# Patient Record
Sex: MALE
[De-identification: names, ages, dates, MRNs, and addresses within clinical notes are randomized per-mention and may not be internally consistent; named-entity substitution may affect disease eponyms.]

---

## 2018-12-20 LAB
ALBUMIN SERPL-MCNC: 4.5 G/DL (ref 3.5–5)
ALBUMIN/GLOB SERPL: 1.4 {RATIO} (ref 0.8–2)
ALP SERPL-CCNC: 99 IU/L (ref 40–150)
ALT SERPL-CCNC: 19 IU/L (ref 0–55)
ANION GAP SERPL CALC-SCNC: 16.7 MMOL/L (ref 8–16)
BASOPHILS # BLD AUTO: 0.1 10*3/UL (ref 0–0.1)
BASOPHILS NFR BLD AUTO: 0.5 % (ref 0–1)
BUN SERPL-MCNC: 12 MG/DL (ref 7–26)
BUN/CREAT SERPL: 11 (ref 6–25)
CALCIUM SERPL-MCNC: 9.9 MG/DL (ref 8.4–10.2)
CHLORIDE SERPL-SCNC: 104 MMOL/L (ref 98–107)
CO2 SERPL-SCNC: 24 MMOL/L (ref 22–29)
DEPRECATED INR PLAS: 0.86
DEPRECATED NEUTROPHILS # BLD AUTO: 4.4 10*3/UL (ref 2.1–6.9)
EGFRCR SERPLBLD CKD-EPI 2021: > 60 ML/MIN (ref 60–?)
EOSINOPHIL # BLD AUTO: 0.6 10*3/UL (ref 0–0.4)
EOSINOPHIL NFR BLD AUTO: 5.9 % (ref 0–6)
ERYTHROCYTE [DISTWIDTH] IN CORD BLOOD: 13.2 % (ref 11.7–14.4)
GLOBULIN PLAS-MCNC: 3.3 G/DL (ref 2.3–3.5)
GLUCOSE SERPLBLD-MCNC: 102 MG/DL (ref 74–118)
HCT VFR BLD AUTO: 49.7 % (ref 38.2–49.6)
HGB BLD-MCNC: 16.6 G/DL (ref 14–18)
LYMPHOCYTES # BLD: 3.4 10*3/UL (ref 1–3.2)
LYMPHOCYTES NFR BLD AUTO: 36.5 % (ref 18–39.1)
MCH RBC QN AUTO: 31.9 PG (ref 28–32)
MCHC RBC AUTO-ENTMCNC: 33.4 G/DL (ref 31–35)
MCV RBC AUTO: 95.4 FL (ref 81–99)
MONOCYTES # BLD AUTO: 0.8 10*3/UL (ref 0.2–0.8)
MONOCYTES NFR BLD AUTO: 9 % (ref 4.4–11.3)
NEUTS SEG NFR BLD AUTO: 47.8 % (ref 38.7–80)
PLATELET # BLD AUTO: 210 X10E3/UL (ref 140–360)
POTASSIUM SERPL-SCNC: 4.7 MMOL/L (ref 3.5–5.1)
PROTHROMBIN TIME: 12.5 SECONDS (ref 11.9–14.5)
RBC # BLD AUTO: 5.21 X10E6/UL (ref 4.3–5.7)
SODIUM SERPL-SCNC: 140 MMOL/L (ref 136–145)

## 2018-12-21 ENCOUNTER — HOSPITAL ENCOUNTER (OUTPATIENT)
Dept: HOSPITAL 88 - CATH LAB | Age: 58
Discharge: HOME | End: 2018-12-21
Attending: INTERNAL MEDICINE
Payer: COMMERCIAL

## 2018-12-21 VITALS — SYSTOLIC BLOOD PRESSURE: 167 MMHG | DIASTOLIC BLOOD PRESSURE: 87 MMHG

## 2018-12-21 VITALS — WEIGHT: 195 LBS | BODY MASS INDEX: 27.92 KG/M2 | HEIGHT: 70 IN

## 2018-12-21 VITALS — DIASTOLIC BLOOD PRESSURE: 80 MMHG | SYSTOLIC BLOOD PRESSURE: 150 MMHG

## 2018-12-21 VITALS — SYSTOLIC BLOOD PRESSURE: 126 MMHG | DIASTOLIC BLOOD PRESSURE: 75 MMHG

## 2018-12-21 VITALS — SYSTOLIC BLOOD PRESSURE: 122 MMHG | DIASTOLIC BLOOD PRESSURE: 77 MMHG

## 2018-12-21 VITALS — DIASTOLIC BLOOD PRESSURE: 77 MMHG | SYSTOLIC BLOOD PRESSURE: 132 MMHG

## 2018-12-21 DIAGNOSIS — I70.212: Primary | ICD-10-CM

## 2018-12-21 DIAGNOSIS — Z01.812: ICD-10-CM

## 2018-12-21 DIAGNOSIS — Z82.49: ICD-10-CM

## 2018-12-21 DIAGNOSIS — Z79.82: ICD-10-CM

## 2018-12-21 DIAGNOSIS — Z86.718: ICD-10-CM

## 2018-12-21 DIAGNOSIS — I10: ICD-10-CM

## 2018-12-21 PROCEDURE — 85610 PROTHROMBIN TIME: CPT

## 2018-12-21 PROCEDURE — 75716 ARTERY X-RAYS ARMS/LEGS: CPT

## 2018-12-21 PROCEDURE — 80053 COMPREHEN METABOLIC PANEL: CPT

## 2018-12-21 PROCEDURE — 36247 INS CATH ABD/L-EXT ART 3RD: CPT

## 2018-12-21 PROCEDURE — 85025 COMPLETE CBC W/AUTO DIFF WBC: CPT

## 2018-12-21 PROCEDURE — 75625 CONTRAST EXAM ABDOMINL AORTA: CPT

## 2018-12-21 PROCEDURE — 37225: CPT

## 2018-12-21 PROCEDURE — 36415 COLL VENOUS BLD VENIPUNCTURE: CPT

## 2018-12-21 PROCEDURE — 37186 SEC ART THROMBECTOMY ADD-ON: CPT

## 2018-12-22 NOTE — OPERATIVE REPORT
DATE OF PROCEDURE:  December 21, 2018



INDICATIONS:  Peripheral arterial disease, claudication of left lower 

extremity.



PROCEDURES PERFORMED

1. Left abdominal aortogram.

2. Bilateral lower extremity angiograms.

3. Third-order catheter placement from the right femoral artery to left 

superficial femoral artery.

4. Additional third order catheter placement from the right femoral 

artery to the left popliteal artery.

5. Atherectomy of the left common femoral artery with balloon 

angioplasty with drug-eluting balloon.

6. Deployment of right groin VASCADE closer device.



COMPLICATIONS:  None.



BLOOD LOSS:  10 mL.



RECOMMENDATIONS:  Continued medical therapy including dual antiplatelet 

therapy for at least 3 months.  Smoking cessation.



Access obtained in the right femoral artery.  A 6-Romanian sheath was placed. 

 Abdominal aortogram demonstrated a stent in the left iliac artery with 80% 

in-stent re-stenosis with gradient of approximately 40 mm.  The left and 

right femoral arteries had 20-30% stenosis.  Left anterior tibial artery 

was occluded, two-vessel runoff to the left foot, 3-vessels runoff to the 

right foot.





A decision was made to intervene on the left common femoral artery stent.  

The patient underwent atherectomy with large amounts of thrombus requiring 

secondary mechanical thrombectomy, balloon angioplasty with a 7 mm Lutonix 

balloon was performed.  Excellent result, 2 vessel runoff to the left leg.  

No complications.  Right groin repaired using VASCADE.  The patient was 

discharged home same day.







DD:  12/22/2018 09:51

DT:  12/22/2018 14:13

Job#:  [id number] NICHOLAS

## 2019-08-28 ENCOUNTER — HOSPITAL ENCOUNTER (EMERGENCY)
Dept: HOSPITAL 88 - ER | Age: 59
Discharge: HOME | End: 2019-08-28
Payer: COMMERCIAL

## 2019-08-28 VITALS — BODY MASS INDEX: 27.92 KG/M2 | HEIGHT: 70 IN | WEIGHT: 195 LBS

## 2019-08-28 DIAGNOSIS — N20.0: ICD-10-CM

## 2019-08-28 DIAGNOSIS — R10.9: Primary | ICD-10-CM

## 2019-08-28 LAB
BACTERIA URNS QL MICRO: (no result) /HPF
BILIRUB UR QL: NEGATIVE
CLARITY UR: (no result)
COLOR UR: YELLOW
DEPRECATED RBC URNS MANUAL-ACNC: (no result) /HPF (ref 0–5)
EPI CELLS URNS QL MICRO: (no result) /LPF
KETONES UR QL STRIP.AUTO: NEGATIVE
LEUKOCYTE ESTERASE UR QL STRIP.AUTO: NEGATIVE
MUCOUS THREADS URNS QL MICRO: (no result)
NITRITE UR QL STRIP.AUTO: NEGATIVE
PROT UR QL STRIP.AUTO: (no result)
SP GR UR STRIP: 1.02 (ref 1.01–1.02)
UROBILINOGEN UR STRIP-MCNC: 0.2 MG/DL (ref 0.2–1)
WBC #/AREA URNS HPF: (no result) /HPF (ref 0–5)

## 2019-08-28 PROCEDURE — 81001 URINALYSIS AUTO W/SCOPE: CPT

## 2019-08-28 PROCEDURE — 99283 EMERGENCY DEPT VISIT LOW MDM: CPT

## 2019-08-28 PROCEDURE — 74176 CT ABD & PELVIS W/O CONTRAST: CPT

## 2019-08-28 NOTE — DIAGNOSTIC IMAGING REPORT
Exam: CT abdomen and pelvis



Clinical history: Right flank pain



Technique: Helical images of the abdomen and pelvis were obtained without

contrast



DOSE REDUCTION:  The exams was performed according to the departmental

dose-optimization program which includes automated exposure control, adjustment

of the mA and/or kV according to patient size and/or use of iterative

reconstruction technique. Next



Findings: The lung bases are clear. There is no evidence of pleural effusion.

The cardiac size is within normal limits.



Noncontrast images of the liver, spleen, pancreas, gallbladder, adrenal glands,

and right kidney are unremarkable. A 3 mm nephrolithiasis is noted in the left

renal pelvis without evidence of hydronephrosis or hydroureter.



The small and large bowels are normal in caliber without evidence of

obstruction. The appendix is visualized and normal in caliber.



Atherosclerotic calcification of aorta is noted. Otherwise, the aorta and IVC

are normal in caliber. There is no evidence of lymphadenopathy or free fluid.



The bladder, prostate, and seminal vesicles are unremarkable.



Impression:

1. 3 mm left nephrolithiasis.



Signed by: Dr. Mike Shirley MD on 8/28/2019 8:36 AM

## 2020-06-21 ENCOUNTER — HOSPITAL ENCOUNTER (INPATIENT)
Dept: HOSPITAL 88 - ER | Age: 60
LOS: 2 days | Discharge: HOME | DRG: 247 | End: 2020-06-23
Attending: INTERNAL MEDICINE | Admitting: INTERNAL MEDICINE
Payer: COMMERCIAL

## 2020-06-21 VITALS — SYSTOLIC BLOOD PRESSURE: 124 MMHG | DIASTOLIC BLOOD PRESSURE: 78 MMHG

## 2020-06-21 VITALS — HEIGHT: 70 IN | WEIGHT: 195 LBS | BODY MASS INDEX: 27.92 KG/M2

## 2020-06-21 DIAGNOSIS — Z72.89: ICD-10-CM

## 2020-06-21 DIAGNOSIS — Z83.3: ICD-10-CM

## 2020-06-21 DIAGNOSIS — I10: ICD-10-CM

## 2020-06-21 DIAGNOSIS — I65.29: ICD-10-CM

## 2020-06-21 DIAGNOSIS — I73.9: ICD-10-CM

## 2020-06-21 DIAGNOSIS — Z80.9: ICD-10-CM

## 2020-06-21 DIAGNOSIS — E78.5: ICD-10-CM

## 2020-06-21 DIAGNOSIS — I21.4: Primary | ICD-10-CM

## 2020-06-21 DIAGNOSIS — Z82.49: ICD-10-CM

## 2020-06-21 DIAGNOSIS — Z72.0: ICD-10-CM

## 2020-06-21 DIAGNOSIS — Z11.9: ICD-10-CM

## 2020-06-21 DIAGNOSIS — I25.10: ICD-10-CM

## 2020-06-21 DIAGNOSIS — Z82.3: ICD-10-CM

## 2020-06-21 DIAGNOSIS — Z95.5: ICD-10-CM

## 2020-06-21 LAB
ALBUMIN SERPL-MCNC: 4.3 G/DL (ref 3.5–5)
ALBUMIN/GLOB SERPL: 1.4 {RATIO} (ref 0.8–2)
ALP SERPL-CCNC: 92 IU/L (ref 40–150)
ALT SERPL-CCNC: 27 IU/L (ref 0–55)
ANION GAP SERPL CALC-SCNC: 16.2 MMOL/L (ref 8–16)
BASOPHILS # BLD AUTO: 0 10*3/UL (ref 0–0.1)
BASOPHILS NFR BLD AUTO: 0.2 % (ref 0–1)
BUN SERPL-MCNC: 14 MG/DL (ref 7–26)
BUN/CREAT SERPL: 11 (ref 6–25)
CALCIUM SERPL-MCNC: 9.1 MG/DL (ref 8.4–10.2)
CHLORIDE SERPL-SCNC: 106 MMOL/L (ref 98–107)
CK MB SERPL-MCNC: 29.2 NG/ML (ref 0–5)
CK SERPL-CCNC: 402 IU/L (ref 30–200)
CO2 SERPL-SCNC: 22 MMOL/L (ref 22–29)
DEPRECATED APTT PLAS QN: 27.5 SECONDS (ref 23.8–35.5)
DEPRECATED INR PLAS: 0.94
DEPRECATED NEUTROPHILS # BLD AUTO: 8.6 10*3/UL (ref 2.1–6.9)
EGFRCR SERPLBLD CKD-EPI 2021: > 60 ML/MIN (ref 60–?)
EOSINOPHIL # BLD AUTO: 0.4 10*3/UL (ref 0–0.4)
EOSINOPHIL NFR BLD AUTO: 3.1 % (ref 0–6)
ERYTHROCYTE [DISTWIDTH] IN CORD BLOOD: 13.4 % (ref 11.7–14.4)
GLOBULIN PLAS-MCNC: 3 G/DL (ref 2.3–3.5)
GLUCOSE SERPLBLD-MCNC: 95 MG/DL (ref 74–118)
HCT VFR BLD AUTO: 47.5 % (ref 38.2–49.6)
HGB BLD-MCNC: 15.8 G/DL (ref 14–18)
LYMPHOCYTES # BLD: 3.2 10*3/UL (ref 1–3.2)
LYMPHOCYTES NFR BLD AUTO: 23.7 % (ref 18–39.1)
MAGNESIUM SERPL-MCNC: 2.3 MG/DL (ref 1.3–2.1)
MCH RBC QN AUTO: 31.2 PG (ref 28–32)
MCHC RBC AUTO-ENTMCNC: 33.3 G/DL (ref 31–35)
MCV RBC AUTO: 93.9 FL (ref 81–99)
MONOCYTES # BLD AUTO: 1.1 10*3/UL (ref 0.2–0.8)
MONOCYTES NFR BLD AUTO: 8 % (ref 4.4–11.3)
NEUTS SEG NFR BLD AUTO: 64.6 % (ref 38.7–80)
PLATELET # BLD AUTO: 229 X10E3/UL (ref 140–360)
POTASSIUM SERPL-SCNC: 4.2 MMOL/L (ref 3.5–5.1)
PROTHROMBIN TIME: 13.1 SECONDS (ref 11.9–14.5)
RBC # BLD AUTO: 5.06 X10E6/UL (ref 4.3–5.7)
SODIUM SERPL-SCNC: 140 MMOL/L (ref 136–145)

## 2020-06-21 PROCEDURE — 99284 EMERGENCY DEPT VISIT MOD MDM: CPT

## 2020-06-21 PROCEDURE — 82550 ASSAY OF CK (CPK): CPT

## 2020-06-21 PROCEDURE — 72125 CT NECK SPINE W/O DYE: CPT

## 2020-06-21 PROCEDURE — 71045 X-RAY EXAM CHEST 1 VIEW: CPT

## 2020-06-21 PROCEDURE — 99152 MOD SED SAME PHYS/QHP 5/>YRS: CPT

## 2020-06-21 PROCEDURE — 83735 ASSAY OF MAGNESIUM: CPT

## 2020-06-21 PROCEDURE — 84484 ASSAY OF TROPONIN QUANT: CPT

## 2020-06-21 PROCEDURE — 85730 THROMBOPLASTIN TIME PARTIAL: CPT

## 2020-06-21 PROCEDURE — 93005 ELECTROCARDIOGRAM TRACING: CPT

## 2020-06-21 PROCEDURE — 93458 L HRT ARTERY/VENTRICLE ANGIO: CPT

## 2020-06-21 PROCEDURE — 70450 CT HEAD/BRAIN W/O DYE: CPT

## 2020-06-21 PROCEDURE — 85025 COMPLETE CBC W/AUTO DIFF WBC: CPT

## 2020-06-21 PROCEDURE — 82553 CREATINE MB FRACTION: CPT

## 2020-06-21 PROCEDURE — 87635 SARS-COV-2 COVID-19 AMP PRB: CPT

## 2020-06-21 PROCEDURE — 84443 ASSAY THYROID STIM HORMONE: CPT

## 2020-06-21 PROCEDURE — 80053 COMPREHEN METABOLIC PANEL: CPT

## 2020-06-21 PROCEDURE — 70498 CT ANGIOGRAPHY NECK: CPT

## 2020-06-21 PROCEDURE — 99153 MOD SED SAME PHYS/QHP EA: CPT

## 2020-06-21 PROCEDURE — 85610 PROTHROMBIN TIME: CPT

## 2020-06-21 PROCEDURE — 83880 ASSAY OF NATRIURETIC PEPTIDE: CPT

## 2020-06-21 PROCEDURE — 93880 EXTRACRANIAL BILAT STUDY: CPT

## 2020-06-21 PROCEDURE — 92928 PRQ TCAT PLMT NTRAC ST 1 LES: CPT

## 2020-06-21 PROCEDURE — 80061 LIPID PANEL: CPT

## 2020-06-21 PROCEDURE — 93306 TTE W/DOPPLER COMPLETE: CPT

## 2020-06-21 PROCEDURE — 36415 COLL VENOUS BLD VENIPUNCTURE: CPT

## 2020-06-21 RX ADMIN — FAMOTIDINE SCH MG: 10 INJECTION, SOLUTION INTRAVENOUS at 21:52

## 2020-06-21 NOTE — DIAGNOSTIC IMAGING REPORT
History: Pain



Comparison studies: 

None



Technique: 

Axial images were obtained from the skull base to the vertex.  

Coronal and sagittal reconstructions obtained from the axial data.

Dose modulation, iterative reconstruction, and/or weight based adjustment 

of the mA/kV was utilized to reduce the radiation dose to as low as reasonably 

achievable.



Intravenous contrast: None



Findings:



Scalp/skull: 

No abnormalities. No fractures, blastic or lytic lesions.



Extra-axial spaces: 

No masses.  No fluid collections.



Brain sulci: Appropriate for age.

Ventricles: Normal in size and configuration. No hydrocephalus.



Parenchyma: 

No abnormal densities. 

No masses, hemorrhage, acute or chronic cortical vascular insults.



Sellar/suprasellar region: No abnormalities

Craniocervical junction: Patent foramen magnum.  No Chiari one malformation.



Incidental findings:

Scattered mucosal thickening in the ethmoid air cells.



IMPRESSION:



No intracranial abnormalities.



 



Signed by: Dr. Adrian Lujan M.D. on 6/21/2020 4:46 PM

## 2020-06-21 NOTE — EMERGENCY DEPARTMENT NOTE
History of Present Illnes


History of Present Illness


Chief Complaint:  Abdominal Complaints


History of Present Illness


This is a 59 year old  male atient in from home with complaints of a 

sharp pain that started in the left side of his head/neck and radiated to left 

upper chest & down his left arm. Patient states that the pain stated at 0200 

today and woke him up from sleep. Mild SOB and diaphoresis assoc with episode


Historian:  Patient


Arrival Mode:  Car


 Required:  No


Onset (how long ago):  hour(s)


Location:  chest/neck/arm


Quality:  pressure/pain


Radiation:  Reports neck, Reports extremity


Severity:  moderate


Onset quality:  sudden


Duration (how long):  hour(s) (lasted ~1 hr)


Timing of current episode:  intermittent


Progression:  resolved


Chronicity:  new


Context:  Denies recent illness


Relieving factors:  none


Exacerbating factors:  none


Associated symptoms:  Reports diaphoresis, Reports shortness of breath


Treatments prior to arrival:  none





Past Medical/Family History


Physician Review


I have reviewed the patient's past medical and family history.  Any updates have

been documented here.





Past Medical History


Recent Fever:  No


Clinical Suspicion of Infectio:  No


New/Unexplained Change in Ment:  No


Past Medical History:  Hypertension, CAD, Hyperlipedemia


Other Medical History:  


PAD


Other Surgery:  


Angioplasty and stent placement in the left external iliac artery 2015 (Dr. Springer)





Social History


Smoking Cessation:  Current every day smoker


Counseling Performed:  Yes


Alcohol Use:  Occasional


Any Illegal Drug Use:  No


TB Exposure/Symptoms:  No


Physically hurt or threatened:  No





Family History


Family history of heart diseas:  Yes





Other


Last Tetanus:  UTD


Any Pre-Existing Lines (PICC,:  No


Is patient up to date on immun:  Yes


Last Flu:  none


Last Pneumovax:  none





Review of Systems


Review of Systems


Constitutional:  Reports no symptoms


EENTM:  Reports no symptoms


Cardiovascular:  Reports as per HPI


Respiratory:  Reports as per HPI


Gastrointestinal:  Reports no symptoms


Genitourinary:  Reports no symptoms


Musculoskeletal:  Reports no symptoms


Integumentary:  Reports no symptoms


Neurological:  Reports as per HPI


Psychological:  Reports no symptoms


Endocrine:  Reports no symptoms


Hematological/Lymphatic:  Reports no symptoms





Physical Exam


Related Data


Allergies:  


Coded Allergies:  


     No Known Allergies (Unverified , 7/9/15)


Triage Vital Signs





Vital Signs








  Date Time  Temp Pulse Resp B/P (MAP) Pulse Ox O2 Delivery O2 Flow Rate FiO2


 


6/21/20 15:24 98.6 60 16 135/90 100   








Vital signs reviewed:  Yes





Physical Exam


CONSTITUTIONAL





Constitutional:  Present well-developed, Present well-nourished


HENT


HENT:  Present normocephalic, Present atraumatic, Present oropharynx 

clear/moist, Present nose normal


HENT L/R:  Present left ext ear normal, Present right ext ear normal


EYES





Eyes:  Reports PERRL, Reports conjunctivae normal


NECK


Neck:  Present ROM normal


PULMONARY


Pulmonary:  Present effort normal, Present breath sounds normal


CARDIOVASCULAR





Cardiovascular:  Present regular rhythm, Present heart sounds normal, Present 

capillary refill normal, Present normal rate


GASTROINTESTINAL





Abdominal:  Present soft, Present nontender, Present bowel sounds normal


GENITOURINARY





Genitourinary:  Present exam deferred


SKIN


Skin:  Present warm, Present dry


MUSCULOSKELETAL





Musculoskeletal:  Present ROM normal


NEUROLOGICAL





Neurological:  Present alert, Present oriented x 3, Present no gross motor or 

sensory deficits


PSYCHOLOGICAL


Psychological:  Present mood/affect normal, Present judgement normal





Results


Laboratory


Result Diagram:  


6/21/20 1556





Laboratory





Laboratory Tests








Test


 6/21/20


15:56 6/21/20


15:52


 


White Blood Count


 13.33 x10e3/uL


(4.8-10.8) 





 


Red Blood Count


 5.06 x10e6/uL


(4.3-5.7) 





 


Hemoglobin


 15.8 g/dL


(14.0-18.0) 





 


Hematocrit


 47.5 %


(38.2-49.6) 





 


Mean Corpuscular Volume


 93.9 fL


(81-99) 





 


Mean Corpuscular Hemoglobin


 31.2 pg


(28-32) 





 


Mean Corpuscular Hemoglobin


Concent 33.3 g/dL


(31-35) 





 


Red Cell Distribution Width


 13.4 %


(11.7-14.4) 





 


Platelet Count


 229 x10e3/uL


(140-360) 





 


Neutrophils (%) (Auto)


 64.6 %


(38.7-80.0) 





 


Lymphocytes (%) (Auto)


 23.7 %


(18.0-39.1) 





 


Monocytes (%) (Auto)


 8.0  %


(4.4-11.3) 





 


Eosinophils (%) (Auto)


 3.1 %


(0.0-6.0) 





 


Basophils (%) (Auto)


 0.2 %


(0.0-1.0) 





 


Neutrophils # (Auto) 8.6 (2.1-6.9)  


 


Lymphocytes # (Auto) 3.2 (1.0-3.2)  


 


Monocytes # (Auto) 1.1 (0.2-0.8)  


 


Eosinophils # (Auto) 0.4 (0.0-0.4)  


 


Basophils # (Auto) 0.0 (0.0-0.1)  


 


Absolute Immature Granulocyte


(auto 0.05 x10e3/uL


(0-0.1) 





 


Prothrombin Time


 13.1 seconds


(11.9-14.5) 





 


Prothromb Time International


Ratio 0.94 


 





 


Activated Partial


Thromboplast Time 27.5 seconds


(23.8-35.5) 











Laboratory comments


labs delayed due to chemistry machines in lab not working





Imaging


Imaging results reviewed:  Yes





Procedures


12 Lead ECG Interpretation


ECG Interpretation :  


   ECG:  ECG 1


   Date:  Jun 21, 2020


   Time:  16:02


   Rhythm:  sinus rhythm


   Rate:  normal (68)


   QRS axis:  left


   ST segment elevation:  V1, V2 (upsloping), V3 (upsloping), V4 (upsloping)


   T waves normal:  Yes


   Q waves:  V1, V2, V3


   Clinical Impression:  abnormal ECG (poor RWP, ST elevation)


   Additional Comments


ECG sent to Dr MARISOL Anthony - he feels this is not a STEMI, will see patient





Assessment & Plan


Medical Decision Making


MDM


chest pain, headache, CP radiating to left arm - check CBC, CHEM'S, ECG, 

CARDIACS, BNP, CXR, CT BRAIN - R/O STEMI, NSTEMI, PNEUMONIA, CEREBRAL BLEED (MAY

NEED ANTICOAGULATION BECAUSE MY CONCERN IS MOSTLY THAT THIS IS ACS)





Reassessment


Reassessment


REPORT TO DR PAYNE - CARDIAC ENZYMES PENDING, I SPOKE WITH DR TOLBERT FOR 

ADMISSION, DR MARISOL ANTHONY FOR CONSULTANT





Assessment & Plan


Final Impression:  


(1) Chest pain


Depart Disposition:  ADMITTED


Last Vital Signs











  Date Time  Temp Pulse Resp B/P (MAP) Pulse Ox O2 Delivery O2 Flow Rate FiO2


 


6/21/20 17:49  64 13  100   


 


6/21/20 15:24 98.6       








Home Meds


Reported Medications


Hydrochlorothiazide (HYDROCHLOROTHIAZIDE) 25 Mg Tablet, 12.5 MG PO DAILY, #30 

TAB


   12/20/18


Lisinopril (LISINOPRIL) 10 Mg Tablet, 10 MG PO DAILY, #30 TAB


   7/20/15


Aspirin (ASPIR 81) 81 Mg Tablet.dr 81 MG PO DAILY


   7/10/15


Medications in the ED





Enoxaparin Sodium 90 mg NOW  ONCE SC ;  Start 6/21/20 at 18:45;  Stop 6/21/20 at

18:46;  Status UNV


Aspirin 324 mg ONCE  ONCE PO ;  Start 6/21/20 at 18:45;  Stop 6/21/20 at 18:46; 

Status UNV











AMBAR SERNA MD               Jun 21, 2020 19:09

## 2020-06-21 NOTE — DIAGNOSTIC IMAGING REPORT
History: Neck and arm pain

Comparison studies: None



Technique: 

Axial images were obtained through the cervical region..

Coronal and sagittal images reconstructed from the axial data.

Dose modulation, iterative reconstruction, and/or weight based adjustment 

of the mA/kV was utilized to reduce the radiation dose to as low as reasonably 

achievable.



Intravenous contrast: None



Findings:



Fractures: None.

Soft tissues: No gross abnormalities.



Atlantoaxial articulation: Incidental congenital fusion between the anterior

arch of C1 and the basion.

Alignment: Slight reversal of the usual lordosis centered at C4-5. No

scoliosis.

Cervicomedullary junction: No abnormalities. The foramen magnum is patent.



Vertebrae: 

No infection or neoplasm.



Degenerative changes: 

*  Focally calcified anterior annuli from C4 to C7

*  Mild spinal canal stenosis at C6-C7 due to a disc osteophyte complex.

*  Patent foramina. No disc herniations.



IMPRESSION:



1.  No acute abnormalities.



2.  Cannot adequately evaluate for ligament, spinal cord and or vascular

abnormalities.



3.  Degenerative changes as described.



Signed by: Dr. Adrian Lujan M.D. on 6/21/2020 4:49 PM see above  PT eval s/p ORIF

## 2020-06-21 NOTE — DIAGNOSTIC IMAGING REPORT
EXAMINATION:  CHEST SINGLE (PORTABLE)   



COMPARISON:  7/5/2015



INDICATION: Chest pain

^CP

^[id number]

^1625  



DISCUSSION:

Frontal view of the chest obtained at 1613 hours.



HEART AND MEDIASTINUM:  The cardiomediastinal silhouette is unremarkable.

  

LINES:  None.



LUNGS/PLEURA:  The lungs are well inflated and clear. No pneumonia or pulmonary

edema. No pleural effusion or pneumothorax.



BONES AND SOFT TISSUES:  Intact. No focal osseous lesions. The soft tissues are

normal.





IMPRESSION: 

No acute cardiopulmonary disease.



Signed by: Dr. Meño Albarran MD on 6/21/2020 4:44 PM

## 2020-06-22 VITALS — SYSTOLIC BLOOD PRESSURE: 138 MMHG | DIASTOLIC BLOOD PRESSURE: 100 MMHG

## 2020-06-22 VITALS — SYSTOLIC BLOOD PRESSURE: 126 MMHG | DIASTOLIC BLOOD PRESSURE: 74 MMHG

## 2020-06-22 VITALS — DIASTOLIC BLOOD PRESSURE: 96 MMHG | SYSTOLIC BLOOD PRESSURE: 156 MMHG

## 2020-06-22 VITALS — SYSTOLIC BLOOD PRESSURE: 120 MMHG | DIASTOLIC BLOOD PRESSURE: 76 MMHG

## 2020-06-22 VITALS — SYSTOLIC BLOOD PRESSURE: 118 MMHG | DIASTOLIC BLOOD PRESSURE: 77 MMHG

## 2020-06-22 VITALS — DIASTOLIC BLOOD PRESSURE: 77 MMHG | SYSTOLIC BLOOD PRESSURE: 118 MMHG

## 2020-06-22 VITALS — SYSTOLIC BLOOD PRESSURE: 156 MMHG | DIASTOLIC BLOOD PRESSURE: 96 MMHG

## 2020-06-22 VITALS — SYSTOLIC BLOOD PRESSURE: 137 MMHG | DIASTOLIC BLOOD PRESSURE: 90 MMHG

## 2020-06-22 LAB
ALBUMIN SERPL-MCNC: 3.5 G/DL (ref 3.5–5)
ALBUMIN SERPL-MCNC: 3.5 G/DL (ref 3.5–5)
ALBUMIN/GLOB SERPL: 1.2 {RATIO} (ref 0.8–2)
ALBUMIN/GLOB SERPL: 1.2 {RATIO} (ref 0.8–2)
ALP SERPL-CCNC: 94 IU/L (ref 40–150)
ALP SERPL-CCNC: 94 IU/L (ref 40–150)
ALT SERPL-CCNC: 22 IU/L (ref 0–55)
ALT SERPL-CCNC: 22 IU/L (ref 0–55)
ANION GAP SERPL CALC-SCNC: 10.2 MMOL/L (ref 8–16)
ANION GAP SERPL CALC-SCNC: 11 MMOL/L (ref 8–16)
BASOPHILS # BLD AUTO: 0 10*3/UL (ref 0–0.1)
BASOPHILS NFR BLD AUTO: 0.4 % (ref 0–1)
BUN SERPL-MCNC: 12 MG/DL (ref 7–26)
BUN SERPL-MCNC: 12 MG/DL (ref 7–26)
BUN/CREAT SERPL: 11 (ref 6–25)
BUN/CREAT SERPL: 13 (ref 6–25)
CALCIUM SERPL-MCNC: 8.6 MG/DL (ref 8.4–10.2)
CALCIUM SERPL-MCNC: 9.2 MG/DL (ref 8.4–10.2)
CHLORIDE SERPL-SCNC: 107 MMOL/L (ref 98–107)
CHLORIDE SERPL-SCNC: 109 MMOL/L (ref 98–107)
CHOLEST SERPL-MCNC: 179 MD/DL (ref 0–199)
CHOLEST/HDLC SERPL: 7.8 {RATIO} (ref 3.9–4.7)
CK MB SERPL-MCNC: 14.4 NG/ML (ref 0–5)
CK MB SERPL-MCNC: 21.5 NG/ML (ref 0–5)
CK SERPL-CCNC: 201 IU/L (ref 30–200)
CK SERPL-CCNC: 253 IU/L (ref 30–200)
CO2 SERPL-SCNC: 24 MMOL/L (ref 22–29)
CO2 SERPL-SCNC: 29 MMOL/L (ref 22–29)
DEPRECATED NEUTROPHILS # BLD AUTO: 4.6 10*3/UL (ref 2.1–6.9)
EGFRCR SERPLBLD CKD-EPI 2021: > 60 ML/MIN (ref 60–?)
EGFRCR SERPLBLD CKD-EPI 2021: > 60 ML/MIN (ref 60–?)
EOSINOPHIL # BLD AUTO: 0.5 10*3/UL (ref 0–0.4)
EOSINOPHIL NFR BLD AUTO: 5 % (ref 0–6)
ERYTHROCYTE [DISTWIDTH] IN CORD BLOOD: 13.3 % (ref 11.7–14.4)
GLOBULIN PLAS-MCNC: 2.9 G/DL (ref 2.3–3.5)
GLOBULIN PLAS-MCNC: 3 G/DL (ref 2.3–3.5)
GLUCOSE SERPLBLD-MCNC: 101 MG/DL (ref 74–118)
GLUCOSE SERPLBLD-MCNC: 105 MG/DL (ref 74–118)
HCT VFR BLD AUTO: 46.5 % (ref 38.2–49.6)
HDLC SERPL-MSCNC: 23 MG/DL (ref 40–60)
HGB BLD-MCNC: 15 G/DL (ref 14–18)
LDLC SERPL CALC-MCNC: 119 MG/DL (ref 60–130)
LYMPHOCYTES # BLD: 3.7 10*3/UL (ref 1–3.2)
LYMPHOCYTES NFR BLD AUTO: 37.7 % (ref 18–39.1)
MCH RBC QN AUTO: 30.6 PG (ref 28–32)
MCHC RBC AUTO-ENTMCNC: 32.3 G/DL (ref 31–35)
MCV RBC AUTO: 94.9 FL (ref 81–99)
MONOCYTES # BLD AUTO: 0.9 10*3/UL (ref 0.2–0.8)
MONOCYTES NFR BLD AUTO: 9.7 % (ref 4.4–11.3)
NEUTS SEG NFR BLD AUTO: 47 % (ref 38.7–80)
PLATELET # BLD AUTO: 221 X10E3/UL (ref 140–360)
POTASSIUM SERPL-SCNC: 4 MMOL/L (ref 3.5–5.1)
POTASSIUM SERPL-SCNC: 5.2 MMOL/L (ref 3.5–5.1)
RBC # BLD AUTO: 4.9 X10E6/UL (ref 4.3–5.7)
SODIUM SERPL-SCNC: 140 MMOL/L (ref 136–145)
SODIUM SERPL-SCNC: 141 MMOL/L (ref 136–145)
TRIGL SERPL-MCNC: 184 MG/DL (ref 0–149)

## 2020-06-22 PROCEDURE — B2111ZZ FLUOROSCOPY OF MULTIPLE CORONARY ARTERIES USING LOW OSMOLAR CONTRAST: ICD-10-PCS | Performed by: INTERNAL MEDICINE

## 2020-06-22 PROCEDURE — 027034Z DILATION OF CORONARY ARTERY, ONE ARTERY WITH DRUG-ELUTING INTRALUMINAL DEVICE, PERCUTANEOUS APPROACH: ICD-10-PCS | Performed by: INTERNAL MEDICINE

## 2020-06-22 PROCEDURE — B2151ZZ FLUOROSCOPY OF LEFT HEART USING LOW OSMOLAR CONTRAST: ICD-10-PCS | Performed by: INTERNAL MEDICINE

## 2020-06-22 PROCEDURE — 4A023N7 MEASUREMENT OF CARDIAC SAMPLING AND PRESSURE, LEFT HEART, PERCUTANEOUS APPROACH: ICD-10-PCS | Performed by: INTERNAL MEDICINE

## 2020-06-22 RX ADMIN — ASPIRIN SCH MG: 81 TABLET, COATED ORAL at 08:12

## 2020-06-22 RX ADMIN — Medication SCH MG: at 09:00

## 2020-06-22 RX ADMIN — CLOPIDOGREL BISULFATE SCH MG: 75 TABLET, FILM COATED ORAL at 08:12

## 2020-06-22 RX ADMIN — FAMOTIDINE SCH MG: 10 INJECTION, SOLUTION INTRAVENOUS at 20:38

## 2020-06-22 RX ADMIN — FAMOTIDINE SCH MG: 10 INJECTION, SOLUTION INTRAVENOUS at 08:11

## 2020-06-22 NOTE — CONSULTATION
DATE OF CONSULTATION:  06/22/2020

 

Cardiology Consultation. 

 

REQUESTING PHYSICIAN:  Byron Carroll MD.

 

REASON FOR CONSULTATION:  Elevated troponin.

 

HISTORY OF PRESENT ILLNESS:  This is a 59-year-old male with history of hypertension,

who presents with complaints of left-sided neck and chest pain.  The patient reports

that he woke up with pain on the left side of his neck radiating to shoulder and upper

chest this morning.  The pain was described as pressure 8/10 in severity without

shortness of breath, nausea, or diaphoresis.  It lasted approximately one and a half

hours.  He presented to the ER for evaluation and was found to have an elevated troponin

for which Cardiology is consulted.  He denies any edema, orthopnea, or PND.  Denies any

history of heart disease. 

 

REVIEW OF SYSTEMS:

Negative except as per HPI.

 

PAST MEDICAL HISTORY:  

1. Hypertension.

2. Peripheral arterial disease, status post drug-coated balloon angioplasty of 80%

in-stent restenoses of the left external iliac artery stent and 100%  of the left

ADA. 

 

PAST SURGICAL HISTORY:  As above.

 

ALLERGIES:  PLEASE SEE EMR.

 

MEDICATIONS:  Please see medication list.

 

SOCIAL HISTORY:  He smokes a pack a day for the last 25 years.  Drinks approximately 2

times a week.  No illicit drugs. 

 

FAMILY HISTORY:  Pertinent for father with unspecified heart disease.

 

PHYSICAL EXAMINATION:

GENERAL:  Awake and alert in no acute distress. 

HEENT:  Normocephalic, atraumatic.  Pupils equal.  No scleral icterus. 

NECK:  Supple.  No thyromegaly or cervical lymphadenopathy.  No carotid bruits. 

LUNGS:  Clear to auscultation bilaterally.  No wheezes or crackles. 

CARDIOVASCULAR:  Normal rate and regular rhythm.  No murmur.  Normal S1, S2. 

ABDOMEN:  Soft, nontender. 

EXTREMITIES:  No edema. 

NEUROLOGIC:  Nonfocal exam.

LABORATORY DATA:  WBC 9.72, hemoglobin 15, hematocrit 46.5, platelets 221,000.  Sodium

140, potassium 4, chloride 109, CO2 24, BUN 12, and creatinine 0.94.  Troponin 4.4 on

arrival, now downtrending.  EKG, normal sinus rhythm, left axis deviation, inferior

infarct age undetermined and possible anterior infarct age undetermined. 

 

IMPRESSION:  

1. Non-ST elevation myocardial infarction.

2. Hypertension.

3. Peripheral arterial disease.

4. Tobacco use.

 

RECOMMENDATIONS:  Recommendations to keep the patient n.p.o., plan for cardiac

catheterization today.  Obtain echo, fasting lipid panel is pending.  Adjust statin

therapy as necessary.  Continue dual antiplatelet therapy.  Blood pressure is adequately

controlled.  Further recommendations pending test results. 

 

Thank you for this consult.  We will continue to follow.

 

 

 

 

______________________________

Glenda Martin MD

 

ABS/MODL

D:  06/22/2020 09:47:14

T:  06/22/2020 10:32:31

Job #:  [id number]

## 2020-06-22 NOTE — NUR
PT BACK TO ROOM [facility] CATH LAB. VITALS WNL. PT DENIES NEEDS AT THIS TIME.

## 2020-06-23 VITALS — DIASTOLIC BLOOD PRESSURE: 89 MMHG | SYSTOLIC BLOOD PRESSURE: 139 MMHG

## 2020-06-23 VITALS — DIASTOLIC BLOOD PRESSURE: 85 MMHG | SYSTOLIC BLOOD PRESSURE: 135 MMHG

## 2020-06-23 VITALS — SYSTOLIC BLOOD PRESSURE: 135 MMHG | DIASTOLIC BLOOD PRESSURE: 85 MMHG

## 2020-06-23 VITALS — DIASTOLIC BLOOD PRESSURE: 84 MMHG | SYSTOLIC BLOOD PRESSURE: 140 MMHG

## 2020-06-23 LAB
ALBUMIN SERPL-MCNC: 3.4 G/DL (ref 3.5–5)
ALBUMIN/GLOB SERPL: 1.1 {RATIO} (ref 0.8–2)
ALP SERPL-CCNC: 91 IU/L (ref 40–150)
ALT SERPL-CCNC: 19 IU/L (ref 0–55)
ANION GAP SERPL CALC-SCNC: 12.5 MMOL/L (ref 8–16)
BASOPHILS # BLD AUTO: 0 10*3/UL (ref 0–0.1)
BASOPHILS NFR BLD AUTO: 0.2 % (ref 0–1)
BUN SERPL-MCNC: 12 MG/DL (ref 7–26)
BUN/CREAT SERPL: 11 (ref 6–25)
CALCIUM SERPL-MCNC: 8.8 MG/DL (ref 8.4–10.2)
CHLORIDE SERPL-SCNC: 106 MMOL/L (ref 98–107)
CO2 SERPL-SCNC: 27 MMOL/L (ref 22–29)
DEPRECATED NEUTROPHILS # BLD AUTO: 5.6 10*3/UL (ref 2.1–6.9)
EGFRCR SERPLBLD CKD-EPI 2021: > 60 ML/MIN (ref 60–?)
EOSINOPHIL # BLD AUTO: 0.3 10*3/UL (ref 0–0.4)
EOSINOPHIL NFR BLD AUTO: 3 % (ref 0–6)
ERYTHROCYTE [DISTWIDTH] IN CORD BLOOD: 13.2 % (ref 11.7–14.4)
GLOBULIN PLAS-MCNC: 3 G/DL (ref 2.3–3.5)
GLUCOSE SERPLBLD-MCNC: 105 MG/DL (ref 74–118)
HCT VFR BLD AUTO: 44.4 % (ref 38.2–49.6)
HGB BLD-MCNC: 14.6 G/DL (ref 14–18)
LYMPHOCYTES # BLD: 2.6 10*3/UL (ref 1–3.2)
LYMPHOCYTES NFR BLD AUTO: 26.7 % (ref 18–39.1)
MCH RBC QN AUTO: 31.2 PG (ref 28–32)
MCHC RBC AUTO-ENTMCNC: 32.9 G/DL (ref 31–35)
MCV RBC AUTO: 94.9 FL (ref 81–99)
MONOCYTES # BLD AUTO: 1.1 10*3/UL (ref 0.2–0.8)
MONOCYTES NFR BLD AUTO: 11 % (ref 4.4–11.3)
NEUTS SEG NFR BLD AUTO: 58.8 % (ref 38.7–80)
PLATELET # BLD AUTO: 202 X10E3/UL (ref 140–360)
POTASSIUM SERPL-SCNC: 4.5 MMOL/L (ref 3.5–5.1)
RBC # BLD AUTO: 4.68 X10E6/UL (ref 4.3–5.7)
SODIUM SERPL-SCNC: 141 MMOL/L (ref 136–145)

## 2020-06-23 RX ADMIN — FAMOTIDINE SCH MG: 10 INJECTION, SOLUTION INTRAVENOUS at 08:24

## 2020-06-23 RX ADMIN — CLOPIDOGREL BISULFATE SCH MG: 75 TABLET, FILM COATED ORAL at 08:24

## 2020-06-23 RX ADMIN — ASPIRIN SCH MG: 81 TABLET, COATED ORAL at 08:24

## 2020-06-23 RX ADMIN — Medication SCH MG: at 08:24

## 2020-06-23 NOTE — PROGRESS NOTE
DATE:  06/23/2020

 

Cardiology Progress Note 

 

SUBJECTIVE:  The patient denies chest pain or shortness of breath.  He had cardiac

catheterization done yesterday with PCI of the LAD with three stents. We will plan for

staged PCI of the RCA as an outpatient. 

 

OBJECTIVE:  VITAL SIGNS:  Temperaure 98.3 degrees, pulse 59, respiratory rate 15, blood

pressure 135/85, oxygen saturation 100% on room air. 

GENERAL:  Awake, alert, in no acute distress. 

LUNGS:  Clear to auscultation bilaterally.  No wheezes or crackles. 

CARDIOVASCULAR:  Normal rate. Regular rhythm.  No murmur.  Normal S1 and S2. 

ABDOMEN:  Soft, nontender. 

EXTREMITIES:  No edema. Right groin without hematoma or bruit.  Bruising is noted

mainly, mildly tender to palpation. 

 

CARDIAC MEDICATIONS:  

1. Carvedilol 3.125 mg p.o. b.i.d.

2. Plavix 75 mg p.o. daily.

3. Aspirin 81 mg p.o. daily.

4. Atorvastatin 20 mg p.o. at bedtime.

 

LABORATORY DATA:  WBC 9.58, hemoglobin 14.6, hematocrit 44.4, platelets 202.  Sodium

141, potassium 4.5, chloride 106, CO2 of 27, BUN 12, creatinine 1.11. 

 

TELEMETRY:  Personally reviewed and interpreted revealing normal sinus rhythm.

 

IMPRESSION:  

1. Non-ST elevation myocardial infarction.

2. Hypertension.

3. Peripheral arterial disease.

4. Carotid artery disease.

5. Tobacco use.

 

RECOMMENDATIONS:  The patient had PCI of the LAD.  He will need staged PCI of the RCA as

an outpatient.  The patient was counseled on the importance of dual antiplatelet therapy

with aspirin and Plavix. Increase atorvastatin to achieve LDL of less than 70. Given

regional wall motion abnormalities, we will start the patient on carvedilol.  Continue

lisinopril.  Discontinue hydrochlorothiazide.  Carotid Doppler did not reveal

hemodynamically significant disease, but he did have moderate atherosclerotic plaque in

the bilateral common carotid arteries.  CTA of the neck was reviewed.  There is no

hemodynamically significant disease in the carotid arteries, but he does have

significant stenosis of the left vertebral artery at the origin. Risk factor

modification and medical management.  The patient was counseled strongly on the

importance of smoking cessation.  Please have the patient follow up in the office in 2

weeks. 

 

Thank you for this consult.  We will continue to follow.

 

 

 

 

______________________________

MD JALEN Lopez/AGNES

D:  06/23/2020 16:24:26

T:  06/23/2020 16:48:05

Job #:  [id number]

## 2020-06-23 NOTE — DIAGNOSTIC IMAGING REPORT
History: Carotid stenosis

Comparison studies:None



Technique: 

Axial images were obtained from the thoracic inlet through the skull base

following administration of IV contrast. Multiplanar coronal, sagittal, MIP and

volume under 3-D images were reformatted from the axial source data. Dose

modulation, iterative reconstruction, and/or weight based adjustment of the

mA/kV was utilized to reduce the radiation dose to as low as reasonably

achievable. 

Intravenous contrast: 100 cc of Omnipaque 300.



If present, stenosis is calculated utilizing the NASCET method which calculates

the degree of stenosis with reference to the normal lumen of the carotid artery

distal to the stenosis.



Findings:



Aortic arch and major vessels:

Mild scattered hard and soft plaque in the aortic arch. Soft plaque in the

proximal left subclavian artery which extends to the left vertebral artery

origin results in approximately 25-30% stenosis.



Common carotid arteries:

The origin of the right common carotid artery is suboptimally evaluated due to

beam-hardening artifact from dense IV contrast in the overlying vein. Both

common carotid arteries are otherwise patent and are with mild luminal

regularity due to soft atherosclerotic plaque which result in only mild

stenosis (25-30% on the right and 20% on the left).



Carotid bulbs: 

Patent, no (0%) stenosis bilaterally. Mild nonstenotic calcified plaque on the

right.



Internal carotid arteries: 

Patent, no stenosis.



Vertebral arteries:

Patent, no stenosis on the right. Approximate 70% stenosis at the left

vertebral artery origin due to soft atherosclerotic plaque. The left vertebral

artery is dominant.



Included [place]:

Patent internal carotid arteries. No proximal branch occlusion or stenosis in

the bilateral anterior cerebral arteries or middle cerebral arteries.

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

The basilar artery is patent and there is no proximal branch occlusion or

stenosis in the bilateral posterior cerebral arteries. There is anatomical

variant right fetal--type PCA origin.



Incidental findings: 

Maxillary dental caries with multifocal periodontal disease with multiple

periapical lucencies.



Scattered nonspecific inflammatory mucosal thickening throughout the paranasal

sinuses which are partially opacified.



IMPRESSION:



1.  Scattered atherosclerosis with atherosclerotic plaques as described.

2.  Approximate 70% stenosis at the left vertebral artery origin.

3.  No (0%) stenosis at the carotid bulbs by NASCET criteria.



Signed by: Dr. Jason Sullivan M.D. on 6/23/2020 3:59 PM

## 2020-06-26 NOTE — DISCHARGE SUMMARY
ADMISSION DIAGNOSES:  Chest pain, neck and scalp pain, coronary artery disease,

hypertension, elevated BNP. 

 

DISCHARGE DIAGNOSES:  Chest pain, neck and scalp pain, coronary artery disease,

hypertension, elevated BNP, carotid stenosis, CAD requiring PCI, hyperlipidemia. 

 

HISTORY:  CAD with stents, hypertension, hyperlipidemia.

 

SURGICAL HISTORY:  Left lower extremity PCI.

 

FAMILY HISTORY:  The patient's aunt, uncles, and mother had diabetes.  The patient's

father had cancer and a stroke. 

 

SOCIAL HISTORY:  Occasional alcohol use, 1 pack of cigarettes a day.

 

HOSPITAL COURSE:  A 59-year-old male admits with complaints of posterior left head and

neck pressure that began Saturday morning.  The pain radiated to his left shoulder and

upper arm.  He denies shortness of breath, diaphoresis, dizziness, and chest pain.  He

took Pepto-Bismol and NSAIDs and the pain went away.  On admission, troponins were 4.44,

trended down.  Chest x-ray was negative.  Echo showed an EF of 50%.  The patient was

started on aspirin, Plavix, and Lipitor.  Cardiology was consulted.  CT of the C-spine

was negative.  Carotid Doppler showed evidence of carotid stenosis, so a CTA of the neck

was done, which showed scattered arthrosclerosis without arthrosclerotic plaque,

approximately 70% stenosis of the left vertebral artery origin, 0 stenosis of the

carotid bulbs.  The patient was then taken for a heart catheterization and had 3 stents

on the LAD.  The patient is okay to discharge home with new prescriptions for Lipitor

and Coreg.  He will continue aspirin and Plavix as well as lisinopril.  He was advised

to stop his hydrochlorothiazide.  The patient understands discharge instructions and

agrees to plan.  He will follow up with primary care and Cardiology in 1 to 2 weeks. 

 

 

Dictated by Amairani Huddleston NP

 

______________________________

MD MIKEY Dias/AGNES

D:  06/25/2020 17:04:40

T:  06/25/2020 21:58:39

Job #:  [id number]

## 2020-07-09 NOTE — OPERATIVE REPORT
DATE OF PROCEDURE:  06/22/2020

 

SURGEON:  Faizan Galvan DO

 

PROCEDURES PERFORMED:  

1. Conscious sedation, 1 hour.

2. Selective coronary angiography x2.

3. Percutaneous transluminal coronary angioplasty and drug-eluting stent placement from

the mid to distal LAD. 

4. Left heart catheterization.

 

PREPROCEDURE DIAGNOSIS:  Precordial pain with elevated troponins.

 

POSTPROCEDURE DIAGNOSIS:  Coronary artery disease.

 

ESTIMATED BLOOD LOSS:  Less than 20 mL.

 

SPECIMENS REMOVED:  None.

 

PROCEDURE IN DETAIL:  After informed consent was obtained, the patient was brought to

the cardiac catheterization laboratory in a fasting and nonsedated state.  Bilateral

groins were prepped and draped in usual sterile fashion.  His right wrist was prepped

and draped in usual sterile fashion.  Using 2% lidocaine, this was infiltrated over the

right anterior groin for local anesthesia.  Using a micropuncture needle, the right

common femoral artery was accessed via modified Seldinger technique and a 6-Somali

sheath was placed.  The patient received fentanyl and midazolam for 1 hour, administered

by the cath lab nurse and his physiologic and neurologic status were monitored by myself

and cath lab staff.  Diagnostic coronary angiography was performed and left heart

catheterization was performed, which showed three severe tandem LAD lesions.  Decision

was made to intervene.  The patient's left main was cannulated with a 6-Somali XB LAD

3.5 guide catheter.  Lesion was crossed with a Runthrough wire, then pre-dilated with

2.5 x 12 balloons.  Next, the distal LAD was stented with a 3 x 38 synergy drug-eluting

stent.  The midportion was stented in an overlapping fashion with a 3.5 x 38 Synergy

drug-eluting stent.  Next, the proximal portion was stented with a 4 x 16 Synergy

drug-eluting stent.  The entire stent leak was then post dilated with a 4 mm

noncompliant balloon.  The patient tolerated the procedure well with no immediate

complications and transferred back to his room in stable condition.  Hemostasis was

achieved via Mynx device. 

 

PROCEDURAL FINDINGS:  

1. Left main coronary artery is patent.

2. The proximal LAD is patent and there are three severe tandem LAD lesions starting in

the midportion, extending down into the distal LAD.  The ostium of the first diagonal

branch has a 60% stenosis, however, it is small, less than 2 mm vessel. 

3. Left circumflex coronary artery provides one obtuse marginal vessel and luminal

irregularities. 

4. Right coronary artery is a medium-sized vessel, provides posterior lateral branch in

the PDA.  PDA itself with 60% stenosis and the distal RCA has a 70% stenosis. 

 

IMPRESSION:  Coronary artery disease, status post intervention of the LAD.

 

RECOMMENDATIONS:  Continue dual-antiplatelet therapy.

 

 

 

 

______________________________

DO NAEEM [place]/AGNES

D:  07/09/2020 10:56:52

T:  07/09/2020 11:37:01

Job #:  [id number]